# Patient Record
Sex: MALE | Race: WHITE
[De-identification: names, ages, dates, MRNs, and addresses within clinical notes are randomized per-mention and may not be internally consistent; named-entity substitution may affect disease eponyms.]

---

## 2018-03-04 ENCOUNTER — HOSPITAL ENCOUNTER (EMERGENCY)
Dept: HOSPITAL 56 - MW.ED | Age: 38
Discharge: HOME | End: 2018-03-04
Payer: OTHER GOVERNMENT

## 2018-03-04 DIAGNOSIS — G43.909: Primary | ICD-10-CM

## 2018-03-04 PROCEDURE — 99284 EMERGENCY DEPT VISIT MOD MDM: CPT

## 2018-03-04 PROCEDURE — 96375 TX/PRO/DX INJ NEW DRUG ADDON: CPT

## 2018-03-04 PROCEDURE — 96374 THER/PROPH/DIAG INJ IV PUSH: CPT

## 2018-03-04 PROCEDURE — 96361 HYDRATE IV INFUSION ADD-ON: CPT

## 2018-03-04 NOTE — EDM.PDOC
ED HPI GENERAL MEDICAL PROBLEM





- General


Chief Complaint: Headache


Stated Complaint: MIGRAINE


Time Seen by Provider: 03/04/18 19:40


Source of Information: Reports: Patient


History Limitations: Reports: No Limitations





- History of Present Illness


INITIAL COMMENTS - FREE TEXT/NARRATIVE: 


HISTORY AND PHYSICAL:





History of present illness:


[Comes to the emergency room complaining of a migraine headache. He has a 

history of migraines since he was in second grade. He woke up with a typical 

migraine headache this morning with pain across his forehead and behind his 

right eye. He is experiencing photophobia. He's taken approximately 8 Excedrin 

and some other over-the-counter headache remedies which have provided no 

improvement of symptom. He is feeling nauseated but has not had any vomiting. 

He denies dizziness, lightheadedness, blurred vision and double vision. No 

chest pain shortness of breath or difficulty breathing. He has recently moved 

to the area from [place] and has not established with a local primary care 

provider he states that [facility]. He does not smoke cigarettes or use tobacco. Not allergic to any 

medications.]





Review of systems: 


As per history of present illness and below otherwise all systems reviewed and 

negative.





Past medical history: 


As per history of present illness and as reviewed below otherwise 

noncontributory.





Surgical history: 


As per history of present illness and as reviewed below otherwise 

noncontributory.





Social history: 


No reported history of drug or alcohol abuse.





Family history: 


As per history of present illness and as reviewed below otherwise 

noncontributory.





Physical exam:


HEENT: Atraumatic, normocephalic. PERRLA. EOMI.  mucous membranes moist, throat 

clear. neck supple, nontender.


Lungs: Clear to auscultation, breath sounds equal bilaterally, chest nontender.


Heart: S1S2, regular, negative for clicks, rubs, or JVD.


Abdomen: Soft, nondistended, nontender. Negative for masses, guarding and 

rebound. Negative for costovertebral tenderness.


Pelvis: Stable nontender.


Genitourinary: Deferred.


Rectal: Deferred.


Extremities: Atraumatic. Neurovascular unremarkable.


Neuro: Awake, alert, oriented.  Motor and sensory unremarkable throughout. Exam 

nonfocal.





Therapeutics:


[1 liter NS, Toradol 30mg IV, Zofran 4mg IV, Reglan 10mg IV, Benadryl 50mg IV]





Impression: 


[Migraine HA]





Plan:


[Following infusion of medication he reports that his symptoms have resolved 

completely and he rates his pain is 0 out of 10. He would like to be discharged 

so he can go home and go to bed. Encouraged him to establish care with a local 

primary care provider in follow-up there in the next couple of days. He 

verbalized understanding of today's discussion.]





Definitive disposition and diagnosis as appropriate pending reevaluation and 

review of above.








  ** headache


Pain Score (Numeric/FACES): 7





- Related Data


 Allergies











Allergy/AdvReac Type Severity Reaction Status Date / Time


 


No Known Allergies Allergy   Verified 03/04/18 19:47











Home Meds: 


 Home Meds





. [No Known Home Meds]  03/04/18 [History]











ED ROS GENERAL





- Review of Systems


Review Of Systems: ROS reveals no pertinent complaints other than HPI.





- Physical Exam


Exam: See Below





Course





- Vital Signs


Last Recorded V/S: 


 Last Vital Signs











Temp  97.5 F   03/04/18 21:09


 


Pulse  67   03/04/18 21:09


 


Resp  17   03/04/18 21:09


 


BP  102/65   03/04/18 21:09


 


Pulse Ox  95   03/04/18 21:09














- Orders/Labs/Meds


Meds: 


Medications














Discontinued Medications














Generic Name Dose Route Start Last Admin





  Trade Name Dexter  PRN Reason Stop Dose Admin


 


Diphenhydramine HCl  50 mg  03/04/18 19:42  03/04/18 19:59





  Benadryl  IVPUSH  03/04/18 19:43  50 mg





  ONETIME ONE   Administration


 


Sodium Chloride  1,000 mls @ 999 mls/hr  03/04/18 19:42  03/04/18 19:55





  Normal Saline  IV  03/04/18 20:42  999 mls/hr





  STAT ONE   Administration


 


Ketorolac Tromethamine  30 mg  03/04/18 19:42  03/04/18 20:00





  Toradol  IVPUSH  03/04/18 19:43  30 mg





  ONETIME ONE   Administration


 


Metoclopramide HCl  10 mg  03/04/18 19:42  03/04/18 20:01





  Reglan  IV  03/04/18 19:43  10 mg





  ONETIME ONE   Administration


 


Ondansetron HCl  4 mg  03/04/18 19:42  03/04/18 20:02





  Zofran  IVPUSH  03/04/18 19:43  4 mg





  ONETIME ONE   Administration














Departure





- Departure


Time of Disposition: 21:25


Disposition: Home, Self-Care 01


Condition: Good


Clinical Impression: 


 Headache








- Discharge Information


Instructions:  Migraine Headache, Easy-to-Read


Referrals: 


PCP,None [Primary Care Provider] - 


Forms:  ED Department Discharge


Additional Instructions: 


The following information is given to patients seen in the emergency department 

who are being discharged to home. This information is to outline your options 

for follow-up care. We provide all patients seen in our emergency department 

with a follow-up referral.





The need for follow-up, as well as the timing and circumstances, are variable 

depending upon the specifics of your emergency department visit.





If you don't have a primary care physician on staff, we will provide you with a 

referral. We always advise you to contact your personal physician following an 

emergency department visit to inform them of the circumstance of the visit and 

for follow-up with them and/or the need for any referrals to a consulting 

specialist.





The emergency department will also refer you to a specialist when appropriate. 

This referral assures that you have the opportunity for follow-up care with a 

specialist. All of these measure are taken in an effort to provide you with 

optimal care, which includes your follow-up.





Under all circumstances we always encourage you to contact your private 

physician who remains a resource for coordinating your care. When calling for 

follow-up care, please make the office aware that this follow-up is from your 

recent emergency room visit. If for any reason you are refused follow-up, 

please contact the [facility]  emergency 

department at [phone] and asked to speak to the emergency department 

charge nurse.








[facility]


Primary Care


[address] [place] [id number]


Phone: [phone]


Fax: [phone]








Follow-up with your local primary care provider or establish at the clinic 

listed above and follow-up there in 48-72 hours.


Refill headache medications.


Return to ER as needed as discussed.

## 2018-06-29 ENCOUNTER — HOSPITAL ENCOUNTER (OUTPATIENT)
Dept: HOSPITAL 56 - MW.SDS | Age: 38
Discharge: HOME | End: 2018-06-29
Attending: SURGERY
Payer: OTHER GOVERNMENT

## 2018-06-29 DIAGNOSIS — D17.0: Primary | ICD-10-CM

## 2018-06-29 DIAGNOSIS — Z87.891: ICD-10-CM

## 2018-06-29 DIAGNOSIS — G43.909: ICD-10-CM

## 2018-06-29 DIAGNOSIS — G35: ICD-10-CM

## 2018-06-29 DIAGNOSIS — Z79.899: ICD-10-CM

## 2018-06-29 PROCEDURE — 11426 EXC H-F-NK-SP B9+MARG >4 CM: CPT

## 2018-06-29 PROCEDURE — 82962 GLUCOSE BLOOD TEST: CPT

## 2018-06-29 NOTE — OR
SURGEON:

Serge Ventura MD

 

DATE OF PROCEDURE:  06/29/2018

 

PREOPERATIVE DIAGNOSIS:

Head mass.

 

POSTOPERATIVE DIAGNOSIS:

Head mass.

 

PROCEDURE PERFORMED:

Excisional biopsy.

 

COMPLICATIONS:

None.

 

FINDINGS:

The mass is close about 2.6 cm, yellow like egg yolk and including the skin

excised.  It is located at the back of the hand.  Skin incision is 4.1 x 2.1 cm.

 

PROCEDURE IN DETAIL:

The patient was taken to the operating room and placed in supine position.  Upon

induction of mild general sedation, the patient's head was prepped and draped in

sterile fashion.  Shaving has been done before.  Time-out was being called,

patient identified, procedure identified, and antibiotic given.  Procedure was

then started.  Using a skin scalpel, the mesh was located at the back of the

hand, it is about 3 cm and it was excised including skin like a fish-mouth

incision and incision is 4.1 x 2.5 cm and sent for pathology and good hemostasis

achieved by using electrocautery.  The wound was closed with 3-0 Ethilon in a

running baseball stitches and followed with bacitracin cream.  The patient was

awaken, transferred to recovery in hemodynamically stable condition and the

patient tolerated the procedure well.  There were no intraoperative

complications.

 

As always, thank you for the kind referral.

 

 

LISA / TAY

DD:  06/29/2018 08:36:52

DT:  06/29/2018 13:29:49

Job #:  [id number]

## 2018-06-29 NOTE — PCM.OPNOTE
- General Post-Op/Procedure Note


Date of Surgery/Procedure: 06/29/18


Operative Procedure(s): excisional bx head mass


Findings: 


2.5cm yellow eye yolk appearance, well encapsulate; see dict [id number]


Pre Op Diagnosis: head mass


Post-Op Diagnosis: Same


Anesthesia Technique: Moderate Sedation


Primary Surgeon: Serge Ventura


Pathology: 





sent





Complications: None


Condition: Good

## 2018-06-29 NOTE — PCM.PREANE
Preanesthetic Assessment





- Anesthesia/Transfusion/Family Hx


Anesthesia History: Prior Anesthesia Reaction


Family History of Anesthesia Reaction: No


Transfusion History: No Prior Transfusion(s)


Intubation History: Unknown





- Review of Systems


General: No Symptoms


Pulmonary: No Symptoms


Cardiovascular: No Symptoms


Gastrointestinal: No Symptoms


Neurological: No Symptoms


Other: Reports: None





- Physical Assessment


O2 Sat by Pulse Oximetry: 100


Respiratory Rate: 16


Vital Signs: 





 Last Vital Signs











Temp  36.5 C   06/29/18 07:11


 


Pulse  97   06/29/18 07:11


 


Resp  16   06/29/18 07:11


 


BP  132/76   06/29/18 07:11


 


Pulse Ox  100   06/29/18 07:11











Height: 1.85 m


Weight: 85.275 kg


ASA Class: 2


Mental Status: Alert & Oriented x3


Airway Class: Mallampati = 2


Dentition: Reports: Normal Dentition


Thyro-Mental Finger Breadths: 3


Mouth Opening Finger Breadths: 2


ROM/Head Extension: Full


Lungs: Clear to Auscultation, Normal Respiratory Effort


Cardiovascular: Regular Rate, Regular Rhythm





- Allergies


Allergies/Adverse Reactions: 


 Allergies











Allergy/AdvReac Type Severity Reaction Status Date / Time


 


No Known Allergies Allergy   Verified 06/25/18 16:24














- Blood


Blood Available: No





- Anesthesia Plan


Pre-Op Medication Ordered: None





- Acknowledgements


Anesthesia Type Planned: MAC (general anesthesia back-up plan)


Pt an Appropriate Candidate for the Planned Anesthesia: Yes


Alternatives and Risks of Anesthesia Discussed w Pt/Guardian: Yes


Pt/Guardian Understands and Agrees with Anesthesia Plan: Yes





PreAnesthesia Questionnaire


HEENT History: Reports: Impaired Vision


Other HEENT History: hx of optic neuritis right eye,  has permanent blind spot 

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


Cardiovascular History: Reports: None


Respiratory History: Reports: None


Gastrointestinal History: Reports: GERD, Irritable Bowel Syndrome


Genitourinary History: Reports: None


Musculoskeletal History: Reports: Back Pain, Chronic, Other (See Below)


Other Musculoskeletal History: sciatic nerve pain- right side


Neurological History: Reports: Migraines, MS (no symptoms, in remission for a 

long time)


Other Neuro History: MS.  CVID


Psychiatric History: Reports: None, Other (See Below) (h/o anxiety/depression)


Endocrine/Metabolic History: Reports: Other (See Below) (possible DM not taking 

prescribed metformin)


Hematologic History: Reports: None


Immunologic History: Reports: None


Oncologic (Cancer) History: Reports: None


Dermatologic History: Reports: Other (See Below)


Other Dermatologic History: alopecia





- Infectious Disease History


Infectious Disease History: Reports: None





- Past Surgical History


Head Surgeries/Procedures: Reports: None


HEENT Surgical History: Reports: Oral Surgery


Other HEENT Surgeries/Procedures: wisdom teeth


GI Surgical History: Reports: Appendectomy


Musculoskeletal Surgical History: Reports: Shoulder Surgery


Other Musculoskeletal Surgeries/Procedures:: Bankhardt procedure left shoulder





- SUBSTANCE USE


Smoking Status *Q: Current Every Day Smoker (quit 7 years ago)


Tobacco Use Within Last Twelve Months: Cigarettes


Recreational Drug Use History: No





- HOME MEDS


Home Medications: 


 Home Meds





SUMAtriptan [Imitrex] 6 mg SQ ASDIRECTED PRN 06/25/18 [History]











- CURRENT (IN HOUSE) MEDS


Current Meds: 





 Current Medications





Lactated Ringer's (Ringers, Lactated)  1,000 mls @ 125 mls/hr IV ASDIRECTED JODY


   Last Admin: 06/29/18 07:16 Dose:  125 mls/hr





Discontinued Medications





Cefazolin Sodium/Dextrose 2 gm (/ Premix)  50 mls @ 100 mls/hr IV ONETIME ONE


   Stop: 06/29/18 05:29

## 2018-06-29 NOTE — PCM.POSTAN
POST ANESTHESIA ASSESSMENT





- MENTAL STATUS


Mental Status: Alert, Oriented





- RESPIRATORY


Respiratory Status: Respiratory Rate WNL, Airway Patent, O2 Saturation Stable





- CARDIOVASCULAR


CV Status: Pulse Rate WNL, Blood Pressure Stable





- GASTROINTESTINAL


GI Status: No Symptoms





- PAIN


Pain Score: 0





- POST OP HYDRATION


Hydration Status: Adequate & Stable





- OBSERVATIONS


Free Text/Narrative:: 





No anesthesia problems

## 2018-07-17 ENCOUNTER — HOSPITAL ENCOUNTER (EMERGENCY)
Dept: HOSPITAL 56 - MW.ED | Age: 38
Discharge: HOME | End: 2018-07-17
Payer: OTHER GOVERNMENT

## 2018-07-17 DIAGNOSIS — H60.503: ICD-10-CM

## 2018-07-17 DIAGNOSIS — J04.0: Primary | ICD-10-CM

## 2018-07-17 LAB
CHLORIDE SERPL-SCNC: 105 MMOL/L (ref 98–107)
SODIUM SERPL-SCNC: 140 MMOL/L (ref 136–148)

## 2018-07-17 NOTE — EDM.PDOC
ED HPI GENERAL MEDICAL PROBLEM





- General


Chief Complaint: Respiratory Problem


Stated Complaint: LOST VOICE,SORE THROAT, VOMITING, HEADACHE


Time Seen by Provider: 07/17/18 19:02


Source of Information: Reports: Patient


History Limitations: Reports: No Limitations





- History of Present Illness


INITIAL COMMENTS - FREE TEXT/NARRATIVE: 





HISTORY AND PHYSICAL:


[]29-year-old male presenting with sore throat and headache





History of Present Illness:


[]He has been sick for the last 3 days symptoms are getting his voice back is 

improving but his throat and headache and chest tightness has worsened





Review of Systems:


As per history of present illness and below otherwise all 


systems reviewed and negative.  





Past medical history:


As per history of present illness and as reviewed below


otherwise noncontributory.





Surgical history:


As per history of present illness and as reviewed below


otherwise noncontributory.





Social history:


No reported history of drug or alcohol abuse.





Family history:


As per history of present illness and as reviewed below


otherwise noncontributory.





Physical exam:


Alert and oriented male answering questions appropriately in short sentences he 

is very whispery with his voice


HEENT: Atraumatic, normocehpalic, pupils reactive, negative for conjunctival 

pallor or scleral icterus, mucous membranes moist, throat clear, neck supple, 

nontender, trachea midline.  Tympanic membranes bilaterally are erythematous 

there is bulging. Pharynx is quite erythematous. Anterior cervical adenopathy.


He is able to touch his chin to his chest without difficulty.


Lungs: Coarse on auscultation, breath sounds equal bilaterally, chest non 

tender.  


Heart: S1S2, regular, negative for clicks, rubs, or JVD.


Abdomen: Soft, nondistended, nontender.  Negative for masses or 

hepatossplenmegaly. Negative for costovertebral tenderness.


Pelvis: Stable nontender.


Genitourinary: Deferred.


Rectal: Deferred


Extremities: Atraumatic, negative for cords or calf pain.  


Neurovascular unremarkable.


Neuro:  Awake, alert, oriented.  Cranial nerves II through XII


unremarkable.  Cerebellum unremarkable.  Motor and sensory unremarkable 

throughout.  Exam nonfocal.  


All testing is negative for any infection.





Diagnostics:


[]CXR, CBC, CMP, Strep





Therapeutics:


[]DuoNeb





Impression:


[]Bilateral ear infection


Laryngitis





Plan:


[]Discharged home


Over-the-counter medications for any discomfort for his throat


Amoxicillin for his ear infection


Follow-up with your primary care provider in the next 5 days





Definitive disposition and diagnosis as appropriate pending


reevaluation and review of above.  





Onset: Gradual


Duration: Day(s): (3)


Location: Reports: Head, Neck


Quality: Reports: Ache


Severity: Moderate


Improves with: Reports: None


Worsens with: Reports: None


Associated Symptoms: Reports: Cough


  ** throat


Pain Score (Numeric/FACES): 7





  ** generalized bodyaches


Pain Score (Numeric/FACES): 3





- Related Data


 Allergies











Allergy/AdvReac Type Severity Reaction Status Date / Time


 


No Known Allergies Allergy   Verified 07/17/18 19:03











Home Meds: 


 Home Meds





SUMAtriptan [Imitrex] 6 mg SQ ASDIRECTED PRN 06/25/18 [History]


Amoxicillin 875 mg PO BID #20 tab 07/17/18 [Rx]


methylPREDNISolone [Medrol] 4 mg PO ASDIRECTED #1 dosepk 07/17/18 [Rx]











Past Medical History


HEENT History: Reports: Impaired Vision


Other HEENT History: hx of optic neuritis right eye,  has permanent blind spot 

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


Cardiovascular History: Reports: None


Respiratory History: Reports: None


Gastrointestinal History: Reports: GERD, Irritable Bowel Syndrome


Genitourinary History: Reports: None


Musculoskeletal History: Reports: Back Pain, Chronic, Other (See Below)


Other Musculoskeletal History: sciatic nerve pain- right side


Neurological History: Reports: Migraines, MS (no symptoms, in remission for a 

long time)


Other Neuro History: MS.  CVID


Psychiatric History: Reports: None, Other (See Below) (h/o anxiety/depression)


Endocrine/Metabolic History: Reports: Other (See Below) (possible DM not taking 

prescribed metformin)


Hematologic History: Reports: None


Immunologic History: Reports: None


Oncologic (Cancer) History: Reports: None


Dermatologic History: Reports: Other (See Below)


Other Dermatologic History: alopecia





- Infectious Disease History


Infectious Disease History: Reports: None





- Past Surgical History


Head Surgeries/Procedures: Reports: None


HEENT Surgical History: Reports: Oral Surgery


Other HEENT Surgeries/Procedures: wisdom teeth


GI Surgical History: Reports: Appendectomy


Musculoskeletal Surgical History: Reports: Shoulder Surgery


Other Musculoskeletal Surgeries/Procedures:: Bankhardt procedure left shoulder





Social & Family History





- Family History


Family Medical History: Noncontributory





- Caffeine Use


Caffeine Use: Reports: Coffee, Soda





ED ROS GENERAL





- Review of Systems


Review Of Systems: ROS reveals no pertinent complaints other than HPI.





ED EXAM, GENERAL





- Physical Exam


Exam: See Below (see dictation)





Course





- Vital Signs


Last Recorded V/S: 


 Last Vital Signs











Temp  36.6 C   07/17/18 19:01


 


Pulse  88   07/17/18 19:01


 


Resp  20   07/17/18 19:01


 


BP  119/88   07/17/18 19:01


 


Pulse Ox  97   07/17/18 19:01














- Orders/Labs/Meds


Orders: 


 Active Orders 24 hr











 Category Date Time Status


 


 RT Aerosol Therapy [RC] ASDIRECTED Care  07/17/18 20:16 Active


 


 Chest 2V [CR] Stat Exams  07/17/18 19:30 Taken


 


 CULTURE STREP A CONFIRMATION [RM] Stat Lab  07/17/18 19:30 Results


 


 STREP SCRN A RAPID W CULT CONF [RM] Stat Lab  07/17/18 19:30 Ordered











Labs: 


 Laboratory Tests











  07/17/18 07/17/18 Range/Units





  19:47 19:47 


 


WBC  9.63   (4.0-11.0)  K/uL


 


RBC  5.25   (4.50-5.90)  M/uL


 


Hgb  15.3   (13.0-17.0)  g/dL


 


Hct  44.5   (38.0-50.0)  %


 


MCV  84.8   (80.0-98.0)  fL


 


MCH  29.1   (27.0-32.0)  pg


 


MCHC  34.4   (31.0-37.0)  g/dL


 


RDW Std Deviation  40.1   (28.0-62.0)  fl


 


RDW Coeff of Linda  13   (11.0-15.0)  %


 


Plt Count  264   (150-400)  K/uL


 


MPV  10.30   (7.40-12.00)  fL


 


Neut % (Auto)  69.2   (48.0-80.0)  %


 


Lymph % (Auto)  22.5   (16.0-40.0)  %


 


Mono % (Auto)  5.5   (0.0-15.0)  %


 


Eos % (Auto)  2.5   (0.0-7.0)  %


 


Baso % (Auto)  0.3   (0.0-1.5)  %


 


Neut # (Auto)  6.7 H   (1.4-5.7)  K/uL


 


Lymph # (Auto)  2.2   (0.6-2.4)  K/uL


 


Mono # (Auto)  0.5   (0.0-0.8)  K/uL


 


Eos # (Auto)  0.2   (0.0-0.7)  K/uL


 


Baso # (Auto)  0.0   (0.0-0.1)  K/uL


 


Nucleated RBC %  0.0   /100WBC


 


Nucleated RBCs #  0   K/uL


 


Sodium   140  (136-148)  mmol/L


 


Potassium   3.7  (3.5-5.1)  mmol/L


 


Chloride   105  ()  mmol/L


 


Carbon Dioxide   28.4  (21.0-32.0)  mmol/L


 


BUN   7  (7.0-18.0)  mg/dL


 


Creatinine   1.1  (0.8-1.3)  mg/dL


 


Est Cr Clr Drug Dosing   102.90  mL/min


 


Estimated GFR (MDRD)   > 60.0  ml/min


 


Glucose   122 H  ()  mg/dL


 


Calcium   9.1  (8.5-10.1)  mg/dL


 


Total Bilirubin   0.3  (0.2-1.0)  mg/dL


 


AST   16  (15-37)  IU/L


 


ALT   30  (14-63)  IU/L


 


Alkaline Phosphatase   93  ()  U/L


 


Total Protein   6.9  (6.4-8.2)  g/dL


 


Albumin   4.0  (3.4-5.0)  g/dL


 


Globulin   2.9  (2.0-3.5)  g/dL


 


Albumin/Globulin Ratio   1.4  (1.3-2.8)  











Meds: 


Medications














Discontinued Medications














Generic Name Dose Route Start Last Admin





  Trade Name Freq  PRN Reason Stop Dose Admin


 


Albuterol/Ipratropium  3 ml  07/17/18 20:16  07/17/18 20:23





  Duoneb 3.0-0.5 Mg/3 Ml  NEB  07/17/18 20:17  3 ml





  ONETIME ONE   Administration





     





     





     





     














Departure





- Departure


Time of Disposition: 20:35


Disposition: Home, Self-Care 01


Condition: Good


Clinical Impression: 


Bilateral external ear infections


Qualifiers:


 Otitis externa type: unspecified type Chronicity: acute Qualified Code(s): 

H60.503 - Unspecified acute noninfective otitis externa, bilateral








- Discharge Information


*PRESCRIPTION DRUG MONITORING PROGRAM REVIEWED*: Not Applicable


*COPY OF PRESCRIPTION DRUG MONITORING REPORT IN PATIENT YODIT: Not Applicable


Prescriptions: 


Amoxicillin 875 mg PO BID #20 tab


methylPREDNISolone [Medrol] 4 mg PO ASDIRECTED #1 dosepk


Referrals: 


PCP,None [Primary Care Provider] - 


Forms:  ED Department Discharge


Additional Instructions: 


The following information is given to patients seen in the emergency department 

who are being discharged to home. This information is to outline your options 

for follow-up care. We provide all patients seen in our emergency department 

with a follow-up referral.





The need for follow-up, as well as the timing and circumstances, are variable 

depending upon the specifics of your emergency department visit.





If you don't have a primary care physician on staff, we will provide you with a 

referral. We always advise you to contact your personal physician following an 

emergency department visit to inform them of the circumstance of the visit and 

for follow-up with them and/or the need for any referrals to a consulting 

specialist.





The emergency department will also refer you to a specialist when appropriate. 

This referral assures that you have the opportunity for followup care with a 

specialist. All of these measure are taken in an effort to provide you with 

optimal care, which includes your followup.





Under all circumstances we always encourage you to contact your private 

physician who remains a resource for coordinating  your care. When calling for 

followup care, please make the office aware that this follow-up is from your 

recent emergency room visit. If for any reason you are refused follow-up, 

please contact the [facility] emergency department at [phone] 

and asked to speak to the emergency department charge nurse.





Follow-up with primary care provider


Amoxicillin 875 mg twice daily for 10 days


Medrol Dosepak


Turned to the emergency room as discussed and directed





- My Orders


Last 24 Hours: 


My Active Orders





07/17/18 20:16


RT Aerosol Therapy [RC] ASDIRECTED 














- Assessment/Plan


Last 24 Hours: 


My Active Orders





07/17/18 20:16


RT Aerosol Therapy [RC] ASDIRECTED

## 2018-07-18 NOTE — CR
EXAM DATE: 18



PATIENT'S AGE: 37





Patient: SIDNEY MARINELLI



Facility: [place], [place]

Patient ID: [id number]

Site Patient ID: [id number].

Site Accession #: [id number].

: 1980

Study: XRay Chest [id number]/17/2018 7:45:38 PM

Ordering Physician: Doctor Temp



Final Report: 

INDICATION: cough



COMPARISON:

None.



FINDINGS:

PA and lateral views of the chest demonstrate adequate inflation of the lungs. 
No focal airspace consolidation, pneumothorax or effusion. The 
cardiomediastinal silhouette is within normal limits. There are no acute 
osseous findings.



IMPRESSION:

No acute cardiopulmonary findings.



Dictated by Parvez Lu MD @ 2018 7:47:44 PM





Dictated by: Parvez Lu MD @ 2018 19:47:53

(Electronic Signature)







Report Signed by Proxy.
[facility]